# Patient Record
Sex: MALE | NOT HISPANIC OR LATINO
[De-identification: names, ages, dates, MRNs, and addresses within clinical notes are randomized per-mention and may not be internally consistent; named-entity substitution may affect disease eponyms.]

---

## 2024-03-03 ENCOUNTER — NON-APPOINTMENT (OUTPATIENT)
Age: 54
End: 2024-03-03

## 2024-03-05 ENCOUNTER — APPOINTMENT (OUTPATIENT)
Dept: PODIATRY | Facility: CLINIC | Age: 54
End: 2024-03-05
Payer: COMMERCIAL

## 2024-03-05 DIAGNOSIS — Z86.79 PERSONAL HISTORY OF OTHER DISEASES OF THE CIRCULATORY SYSTEM: ICD-10-CM

## 2024-03-05 DIAGNOSIS — M84.375A STRESS FRACTURE, LEFT FOOT, INITIAL ENCOUNTER FOR FRACTURE: ICD-10-CM

## 2024-03-05 DIAGNOSIS — Z78.9 OTHER SPECIFIED HEALTH STATUS: ICD-10-CM

## 2024-03-05 DIAGNOSIS — M77.8 OTHER ENTHESOPATHIES, NOT ELSEWHERE CLASSIFIED: ICD-10-CM

## 2024-03-05 PROBLEM — Z00.00 ENCOUNTER FOR PREVENTIVE HEALTH EXAMINATION: Status: ACTIVE | Noted: 2024-03-05

## 2024-03-05 PROCEDURE — 99203 OFFICE O/P NEW LOW 30 MIN: CPT

## 2024-03-05 RX ORDER — LOSARTAN POTASSIUM 100 MG/1
TABLET, FILM COATED ORAL
Refills: 0 | Status: ACTIVE | COMMUNITY

## 2024-03-06 NOTE — PHYSICAL EXAM
[Ankle Swelling (On Exam)] : present [Ankle Swelling On The Left] : of the left ankle [Ankle Swelling On The Right] : mild [2+] : left foot dorsalis pedis 2+ [Normal Foot/Ankle] : Both lower extremities were exposed and visualized. Standing exam demonstrates normal foot posture and alignment. Hindfoot exam shows no hindfoot valgus or varus [Skin Color & Pigmentation] : normal skin color and pigmentation [Skin Turgor] : normal skin turgor [Skin Lesions] : no skin lesions [Sensation] : the sensory exam was normal to light touch and pinprick [No Focal Deficits] : no focal deficits [Deep Tendon Reflexes (DTR)] : deep tendon reflexes were 2+ and symmetric [Motor Exam] : the motor exam was normal [General Appearance - Alert] : alert [General Appearance - Well-Appearing] : healthy appearing [Varicose Veins Of Lower Extremities] : not present [] : not present [Delayed in the Right Toes] : capillary refills normal in right toes [Delayed in the Left Toes] : capillary refills normal in the left toes [de-identified] : Pain along the shaft of the 5th metatarsal, left foot, proximally in the area of a Silverio fracture.   [Foot Ulcer] : no foot ulcer [Skin Induration] : no skin induration [Oriented To Time, Place, And Person] : oriented to person, place, and time

## 2024-03-06 NOTE — HISTORY OF PRESENT ILLNESS
[FreeTextEntry1] : Patient presents today with pain, lateral aspect of the left foot.  Patient states that he jogs 4 miles/day and he developed pain, which started at the 2nd toe as swelling and it then seemed to progress laterally.  On Saturday, the pain worsened and is along the shaft of the left 5th metatarsal.  He had x-rays taken at an outside facility which I independently reviewed.  He has pain on the proximal shaft of the 5th metatarsal, left foot.

## 2024-03-06 NOTE — PHYSICAL EXAM
[Ankle Swelling (On Exam)] : present [Ankle Swelling On The Left] : of the left ankle [Ankle Swelling On The Right] : mild [2+] : left foot dorsalis pedis 2+ [Normal Foot/Ankle] : Both lower extremities were exposed and visualized. Standing exam demonstrates normal foot posture and alignment. Hindfoot exam shows no hindfoot valgus or varus [Skin Color & Pigmentation] : normal skin color and pigmentation [Skin Turgor] : normal skin turgor [Skin Lesions] : no skin lesions [Sensation] : the sensory exam was normal to light touch and pinprick [No Focal Deficits] : no focal deficits [Deep Tendon Reflexes (DTR)] : deep tendon reflexes were 2+ and symmetric [Motor Exam] : the motor exam was normal [General Appearance - Alert] : alert [General Appearance - Well-Appearing] : healthy appearing [] : not present [Varicose Veins Of Lower Extremities] : not present [Delayed in the Right Toes] : capillary refills normal in right toes [Delayed in the Left Toes] : capillary refills normal in the left toes [de-identified] : Pain along the shaft of the 5th metatarsal, left foot, proximally in the area of a Silverio fracture.   [Foot Ulcer] : no foot ulcer [Skin Induration] : no skin induration [Oriented To Time, Place, And Person] : oriented to person, place, and time

## 2024-03-06 NOTE — ASSESSMENT
[FreeTextEntry1] : Impression: Probable stress fracture, left 5th metatarsal (M84.375A).  Capsulitis left tarsus (M77.8).  Treatment: Patient was placed into a surgical shoe.  He is to limit activities.  Apply ice and take oral anti-inflammatories.  Will reevaluate in 10 days and will x-ray at that time to see if there is any evidence of a stress injury.  Presently, it appears to be a stress injury but I cannot rule that out.  I do not feel that an MRI is necessary at this time because the treatment is the same.   Any questions or problems, patient is to contact the office.

## 2024-03-06 NOTE — PROCEDURE
[FreeTextEntry1] : Independently reviewed the x-rays.  There is some spurring around the lesser tarsus, left foot but no evidence of a stress injury at this time.  No obvious facture at this time.

## 2024-03-19 ENCOUNTER — APPOINTMENT (OUTPATIENT)
Dept: PODIATRY | Facility: CLINIC | Age: 54
End: 2024-03-19